# Patient Record
Sex: FEMALE | Race: WHITE | NOT HISPANIC OR LATINO | Employment: FULL TIME | URBAN - METROPOLITAN AREA
[De-identification: names, ages, dates, MRNs, and addresses within clinical notes are randomized per-mention and may not be internally consistent; named-entity substitution may affect disease eponyms.]

---

## 2023-06-08 ENCOUNTER — OFFICE VISIT (OUTPATIENT)
Dept: URGENT CARE | Facility: CLINIC | Age: 53
End: 2023-06-08

## 2023-06-08 VITALS
WEIGHT: 143.5 LBS | TEMPERATURE: 97.8 F | SYSTOLIC BLOOD PRESSURE: 106 MMHG | DIASTOLIC BLOOD PRESSURE: 62 MMHG | RESPIRATION RATE: 18 BRPM | HEIGHT: 64 IN | BODY MASS INDEX: 24.5 KG/M2 | OXYGEN SATURATION: 99 % | HEART RATE: 56 BPM

## 2023-06-08 DIAGNOSIS — H61.22 IMPACTED CERUMEN OF LEFT EAR: Primary | ICD-10-CM

## 2023-06-08 DIAGNOSIS — H60.392 OTHER INFECTIVE ACUTE OTITIS EXTERNA OF LEFT EAR: ICD-10-CM

## 2023-06-08 RX ORDER — FLUOXETINE 10 MG/1
TABLET, FILM COATED ORAL
COMMUNITY
Start: 2023-05-23

## 2023-06-08 RX ORDER — HYDROXYZINE HYDROCHLORIDE 10 MG/1
TABLET, FILM COATED ORAL
COMMUNITY
Start: 2023-05-10

## 2023-06-08 RX ORDER — CIPROFLOXACIN AND DEXAMETHASONE 3; 1 MG/ML; MG/ML
4 SUSPENSION/ DROPS AURICULAR (OTIC) 2 TIMES DAILY
Qty: 7.5 ML | Refills: 0 | Status: SHIPPED | OUTPATIENT
Start: 2023-06-08 | End: 2023-06-13

## 2023-06-08 RX ORDER — TRAZODONE HYDROCHLORIDE 50 MG/1
TABLET ORAL
COMMUNITY
Start: 2023-05-10

## 2023-06-08 NOTE — PROGRESS NOTES
[facility]        NAME: Mario Cruz is a 45 y o  female  : 1970    MRN: [id number]  DATE: 2023  TIME: 9:40 AM    Assessment and Plan   Impacted cerumen of left ear [H61 22]  1  Impacted cerumen of left ear        2  Other infective acute otitis externa of left ear  ciprofloxacin-dexamethasone (CIPRODEX) otic suspension        Ear cerumen removal    Date/Time: 2023 8:45 AM    Performed by: Marisa Pace PA-C  Authorized by: Marisa Pace PA-C  Universal Protocol:  Risks and benefits: risks, benefits and alternatives were discussed  Consent given by: patient  Patient identity confirmed: verbally with patient      Patient location:  Clinic  Procedure details:     Local anesthetic:  None    Location:  L ear    Procedure type: irrigation with instrumentation      Instrumentation: curette      Approach:  External  Post-procedure details:     Complication:  Macerated skin    Hearing quality:  Improved    Patient tolerance of procedure: Tolerated well, no immediate complications        Patient Instructions   L Otitis Externa:   -The patients hx and presentation are consistent with otitis externa secondary to the debrox use and cerumen impaction  Will treat with Cipro-Dex topically used as directed  -You can take Advil or Tylenol for pain  -Cool or warm compress on the ear for comfort   -Avoid q-tips in the ear until your sx improve   -Keep the area clean and dry otherwise  -If you experience fever, chills, drainage from the ear, worsening pain, hearing loss see your PCP immediately for recheck     Follow up with PCP in 3-5 days  Proceed to  ER if symptoms worsen  Chief Complaint     Chief Complaint   Patient presents with   • Ear Fullness     Pt here for left ear fullness, clogged  on going x months  pt did see her pcp  did  rafa  and now it is worst   No pain            History of Present Illness       The patient is a 76-year-old female who presents today for cerumen impaction of the L ear  She states that her PCP told her last week to use debrox for a week  She has been applying five drops a day with no relief  Yesterday attempted to irrigate the ear last night with no relief  She has decreased hearing and fullness  No fever, chills  No otorrhea or tinnitus  No swelling of the mastoid or pain  No URI sx  Review of Systems   Review of Systems   Constitutional: Negative for activity change, appetite change, chills, diaphoresis, fatigue and fever  HENT: Positive for ear pain and hearing loss  Negative for congestion, ear discharge, facial swelling, postnasal drip, rhinorrhea, sinus pressure, sinus pain, sore throat, tinnitus, trouble swallowing and voice change  Eyes: Negative for visual disturbance  Respiratory: Negative for apnea, cough, chest tightness, shortness of breath, wheezing and stridor  Cardiovascular: Negative for chest pain, palpitations and leg swelling  Gastrointestinal: Negative for abdominal distention and abdominal pain  Genitourinary: Negative for decreased urine volume  Musculoskeletal: Negative for arthralgias, joint swelling, myalgias, neck pain and neck stiffness  Skin: Negative for rash  Allergic/Immunologic: Negative for immunocompromised state  Neurological: Negative for dizziness, weakness, light-headedness, numbness and headaches  Hematological: Negative for adenopathy           Current Medications       Current Outpatient Medications:   •  ciprofloxacin-dexamethasone (CIPRODEX) otic suspension, Administer 4 drops into the left ear 2 (two) times a day for 5 days, Disp: 7 5 mL, Rfl: 0  •  FLUoxetine (PROzac) 10 MG tablet, , Disp: , Rfl:   •  hydrOXYzine HCL (ATARAX) 10 mg tablet, , Disp: , Rfl:   •  traZODone (DESYREL) 50 mg tablet, , Disp: , Rfl:     Current Allergies     Allergies as of 06/08/2023   • (No Known Allergies)            The following portions of the patient's history were reviewed and updated as appropriate: "allergies, current medications, past family history, past medical history, past social history, past surgical history and problem list      Past Medical History:   Diagnosis Date   • Anxiety    • Depression        Past Surgical History:   Procedure Laterality Date   •  SECTION         Family History   Problem Relation Age of Onset   • No Known Problems Mother          Medications have been verified  Objective   /62   Pulse 56   Temp 97 8 °F (36 6 °C)   Resp 18   Ht 5' 3 5\" (1 613 m)   Wt 65 1 kg (143 lb 8 oz)   SpO2 99%   BMI 25 02 kg/m²   No LMP recorded  Physical Exam     Physical Exam  Vitals and nursing note reviewed  Constitutional:       General: She is not in acute distress  Appearance: She is well-developed  She is not ill-appearing, toxic-appearing or diaphoretic  HENT:      Head: Normocephalic and atraumatic  Right Ear: Hearing, tympanic membrane, ear canal and external ear normal  No swelling  There is no impacted cerumen  Tympanic membrane is not retracted or bulging  Left Ear: Hearing, tympanic membrane, ear canal and external ear normal  No decreased hearing noted  Swelling present  No drainage or tenderness  There is impacted cerumen  Tympanic membrane is not retracted or bulging  Ears:      Comments: L ear: There was an initial firm, dark brown cerumen impaction that was cleared and showed a macerated, erythematous canal and TM  Nose: Nose normal  No mucosal edema or rhinorrhea  Right Sinus: No maxillary sinus tenderness or frontal sinus tenderness  Left Sinus: No maxillary sinus tenderness or frontal sinus tenderness  Mouth/Throat:      Pharynx: Uvula midline  No oropharyngeal exudate, posterior oropharyngeal erythema or uvula swelling  Tonsils: No tonsillar abscesses  Cardiovascular:      Rate and Rhythm: Normal rate and regular rhythm  Heart sounds: S1 normal and S2 normal  Heart sounds not distant   No murmur " heard      No friction rub  No gallop  No S3 or S4 sounds  Pulmonary:      Effort: No tachypnea, bradypnea, accessory muscle usage or respiratory distress  Breath sounds: No decreased breath sounds, wheezing, rhonchi or rales  Musculoskeletal:      Cervical back: Normal range of motion and neck supple  Lymphadenopathy:      Cervical: No cervical adenopathy  Neurological:      Mental Status: She is alert and oriented to person, place, and time     Psychiatric:         Behavior: Behavior normal

## 2023-06-08 NOTE — PATIENT INSTRUCTIONS
L Otitis Externa:   -The patients hx and presentation are consistent with otitis externa secondary to the debrox use and cerumen impaction  Will treat with Cipro-Dex topically used as directed     -You can take Advil or Tylenol for pain  -Cool or warm compress on the ear for comfort   -Avoid q-tips in the ear until your sx improve   -Keep the area clean and dry otherwise  -If you experience fever, chills, drainage from the ear, worsening pain, hearing loss see your PCP immediately for recheck

## 2025-05-06 ENCOUNTER — OFFICE VISIT (OUTPATIENT)
Age: 55
End: 2025-05-06
Payer: COMMERCIAL

## 2025-05-06 VITALS — WEIGHT: 143 LBS | BODY MASS INDEX: 24.41 KG/M2 | HEIGHT: 64 IN

## 2025-05-06 DIAGNOSIS — B35.1 ONYCHOMYCOSIS: Primary | ICD-10-CM

## 2025-05-06 PROCEDURE — 88305 TISSUE EXAM BY PATHOLOGIST: CPT | Performed by: STUDENT IN AN ORGANIZED HEALTH CARE EDUCATION/TRAINING PROGRAM

## 2025-05-06 PROCEDURE — 99203 OFFICE O/P NEW LOW 30 MIN: CPT | Performed by: STUDENT IN AN ORGANIZED HEALTH CARE EDUCATION/TRAINING PROGRAM

## 2025-05-06 PROCEDURE — 88312 SPECIAL STAINS GROUP 1: CPT | Performed by: STUDENT IN AN ORGANIZED HEALTH CARE EDUCATION/TRAINING PROGRAM

## 2025-05-06 NOTE — PROGRESS NOTES
"[facility] Office Visit    ASSESSMENT     Diagnoses and all orders for this visit:    Onychomycosis  -     Hepatic function panel; Future  -     Tissue Exam; Future  -     Tissue Exam         Problem List Items Addressed This Visit    None  Visit Diagnoses         Onychomycosis    -  Primary    Relevant Orders    Hepatic function panel    Tissue Exam          PLAN  -Patient was educated regarding their condition  -Biopsy of nail sent for PAS stain  -If the cultures come back positive, we will pursue a course of PO terbinafine  -F/u hepatic function panel in anticipation of terbinafine treatment  -RTC in 6-months      SUBJECTIVE    Chief Complaint:  Toenail discoloration     Patient ID: Phuong Baca is a pleasant 54 year old female who presents today for possible fungus on her right great toe. She noticed it a few months ago. She states she has been trying to get rid of it by putting an oil on it that is supposed to dry it out however it has not been helpful. She also mentions that it sometimes it gives her pain at night.           The following portions of the patient's history were reviewed and updated as appropriate: allergies, current medications, past family history, past medical history, past social history, past surgical history and problem list.    Review of Systems   Constitutional: Negative.    HENT: Negative.     Respiratory: Negative.     Cardiovascular: Negative.    Gastrointestinal: Negative.    Musculoskeletal: Negative.    Skin:  Positive for color change.   Neurological: Negative.          OBJECTIVE      Ht 5' 3.5\" (1.613 m)   Wt 64.9 kg (143 lb)   BMI 24.93 kg/m²        Physical Exam  Constitutional:       Appearance: Normal appearance.   HENT:      Head: Normocephalic and atraumatic.   Eyes:      General:         Right eye: No discharge.         Left eye: No discharge.   Cardiovascular:      Rate and Rhythm: Normal rate and regular rhythm.      Pulses:  "          Dorsalis pedis pulses are 2+ on the right side and 2+ on the left side.        Posterior tibial pulses are 2+ on the right side and 2+ on the left side.   Pulmonary:      Effort: Pulmonary effort is normal.      Breath sounds: Normal breath sounds.   Feet:      Right foot:      Toenail Condition: Right toenails are abnormally thick. Fungal disease present.  Skin:     General: Skin is warm.      Capillary Refill: Capillary refill takes less than 2 seconds.   Neurological:      Sensory: Sensation is intact. No sensory deficit.

## 2025-05-12 ENCOUNTER — TELEPHONE (OUTPATIENT)
Age: 55
End: 2025-05-12

## 2025-05-12 NOTE — TELEPHONE ENCOUNTER
Caller: Phuong Gonzalez    Doctor and/or Office: Dr. Tan/[facility]#: [phone]    Escalation: Last office visit Patient calling in about her test results. Please return call. Thank you

## 2025-05-12 NOTE — TELEPHONE ENCOUNTER
ELTON pt advised her that we do not have the results of the PAS Stain and we will call her to let her know. She was polite and thankful.

## 2025-05-14 ENCOUNTER — TELEPHONE (OUTPATIENT)
Age: 55
End: 2025-05-14

## 2025-05-19 ENCOUNTER — APPOINTMENT (OUTPATIENT)
Dept: LAB | Facility: CLINIC | Age: 55
End: 2025-05-19
Payer: COMMERCIAL

## 2025-05-19 DIAGNOSIS — B35.1 ONYCHOMYCOSIS: ICD-10-CM

## 2025-05-19 LAB
ALBUMIN SERPL BCG-MCNC: 4.3 G/DL (ref 3.5–5)
ALP SERPL-CCNC: 64 U/L (ref 34–104)
ALT SERPL W P-5'-P-CCNC: 13 U/L (ref 7–52)
AST SERPL W P-5'-P-CCNC: 22 U/L (ref 13–39)
BILIRUB DIRECT SERPL-MCNC: 0.02 MG/DL (ref 0–0.2)
BILIRUB SERPL-MCNC: 0.39 MG/DL (ref 0.2–1)
PROT SERPL-MCNC: 7.2 G/DL (ref 6.4–8.4)

## 2025-05-19 PROCEDURE — 80076 HEPATIC FUNCTION PANEL: CPT

## 2025-05-19 PROCEDURE — 36415 COLL VENOUS BLD VENIPUNCTURE: CPT

## 2025-05-21 DIAGNOSIS — B35.1 ONYCHOMYCOSIS: Primary | ICD-10-CM

## 2025-05-21 RX ORDER — TERBINAFINE HYDROCHLORIDE 250 MG/1
250 TABLET ORAL DAILY
Qty: 84 TABLET | Refills: 0 | Status: SHIPPED | OUTPATIENT
Start: 2025-05-21 | End: 2025-08-13

## 2025-05-22 ENCOUNTER — TELEPHONE (OUTPATIENT)
Age: 55
End: 2025-05-22